# Patient Record
Sex: MALE | Race: WHITE
[De-identification: names, ages, dates, MRNs, and addresses within clinical notes are randomized per-mention and may not be internally consistent; named-entity substitution may affect disease eponyms.]

---

## 2022-08-29 ENCOUNTER — HOSPITAL ENCOUNTER (EMERGENCY)
Dept: HOSPITAL 47 - EC | Age: 42
Discharge: HOME | End: 2022-08-29
Payer: COMMERCIAL

## 2022-08-29 VITALS — TEMPERATURE: 97.8 F

## 2022-08-29 VITALS — HEART RATE: 78 BPM | SYSTOLIC BLOOD PRESSURE: 132 MMHG | DIASTOLIC BLOOD PRESSURE: 89 MMHG | RESPIRATION RATE: 16 BRPM

## 2022-08-29 DIAGNOSIS — T15.02XA: Primary | ICD-10-CM
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
## 2022-08-29 PROCEDURE — 99283 EMERGENCY DEPT VISIT LOW MDM: CPT

## 2022-08-29 PROCEDURE — 99282 EMERGENCY DEPT VISIT SF MDM: CPT

## 2022-08-29 NOTE — ED
ENT HPI





- General


Chief complaint: ENT


Stated complaint: Foreign object left eye


Time Seen by Provider: 08/29/22 21:07


Source: patient


Mode of arrival: ambulatory


Limitations: no limitations





- History of Present Illness


Initial comments: 





This 42-year-old male presents with a complaint of left eye irritation.  He has 

had some drainage/watery eyes.  He denies any visual complaints.  He feels as 

though he got something in his left eye yesterday.  He was apparently doing some

work in his garage.  He was doing some metal type work at times.  He was doing 

yard work as well.  His eye has been irritated since yesterday.  He denies any 

actual eye trauma.  He was utilizing protective glasses.  There is no visual 

abnormalities.  He does not utilize contacts.  This feels similar to a couple 

years ago when he had a foreign body in his eye as well.  No other complaints or

modifying factors.





- Related Data


                                  Previous Rx's











 Medication  Instructions  Recorded


 


Erythromycin Ophth Oint [Romycin 1 applic RIGHT EYE QID 5 Days #1 09/02/19





Ophth Oint] tube 











                                    Allergies











Allergy/AdvReac Type Severity Reaction Status Date / Time


 


No Known Allergies Allergy   Verified 08/29/22 20:31














Review of Systems


ROS Statement: 


Those systems with pertinent positive or pertinent negative responses have been 

documented in the HPI.





ROS Other: All systems not noted in ROS Statement are negative.





Past Medical History


Past Medical History: No Reported History


History of Any Multi-Drug Resistant Organisms: None Reported


Past Surgical History: Orthopedic Surgery


Additional Past Surgical History / Comment(s): fluid drained from right knee.


Past Psychological History: No Psychological Hx Reported


Smoking Status: Never smoker


Past Alcohol Use History: Occasional


Past Drug Use History: Marijuana





General Exam


Limitations: no limitations


General appearance: alert, in no apparent distress


Head exam: Present: atraumatic, normocephalic


Pupils: Present: other (There is left eye conjunctival erythema.  There is a 

metallic foreign body with rust ring noted at approximately 5:00 on the cornea. 

There is are no foreign bodies noted underneath the eyelids.  Right eye is 

clear.  Pupil is regular.  Extraocular movements are intact.)


Psychiatric exam: Present: normal affect, normal mood


Skin exam: Present: intact.  Absent: rash





Course


                                   Vital Signs











  08/29/22 08/29/22





  20:28 22:28


 


Temperature 97.8 F 97.8 F


 


Pulse Rate 79 78


 


Respiratory 18 16





Rate  


 


Blood Pressure 139/95 132/89


 


O2 Sat by Pulse 99 98





Oximetry  














Medical Decision Making





- Medical Decision Making





The patient was seen and examined.  Proparacaine is placed to the left eye.  He 

is evaluated under the slit lamp and the foreign bodies clearly visualized.  It 

is removed with the eye bur without incident.  The rust ring also is removed 

with the eye bur.  He tolerated this quite well.  No complications were 

encountered.  He is given tobramycin eyedrops and instructed to utilize every 4 

hours.  Return parameters are discussed.  Ophthalmology follow-up given.





Disposition


Clinical Impression: 


 Corneal foreign body





Disposition: HOME SELF-CARE


Condition: Good


Instructions (If sedation given, give patient instructions):  Eye Foreign Body 

(ED)


Additional Instructions: 


Please use tylenol and/or motrin as needed for pain.


Is patient prescribed a controlled substance at d/c from ED?: No


Referrals: 


Kelly Keller MD [Primary Care Provider] - 1-2 days


Zachariah Powers MD [STAFF PHYSICIAN] - 1-2 days


Time of Disposition: 21:39